# Patient Record
Sex: MALE | Race: BLACK OR AFRICAN AMERICAN | NOT HISPANIC OR LATINO | Employment: FULL TIME | URBAN - METROPOLITAN AREA
[De-identification: names, ages, dates, MRNs, and addresses within clinical notes are randomized per-mention and may not be internally consistent; named-entity substitution may affect disease eponyms.]

---

## 2024-10-21 DIAGNOSIS — R05.9 COUGH: Primary | ICD-10-CM

## 2024-10-22 ENCOUNTER — HOSPITAL ENCOUNTER (OUTPATIENT)
Dept: RADIOLOGY | Facility: HOSPITAL | Age: 59
Discharge: HOME | End: 2024-10-22
Attending: ORTHOPAEDIC SURGERY
Payer: COMMERCIAL

## 2024-10-22 DIAGNOSIS — R05.9 COUGH: ICD-10-CM

## 2024-10-22 PROCEDURE — 71046 X-RAY EXAM CHEST 2 VIEWS: CPT | Mod: TC

## 2024-12-10 ENCOUNTER — HOSPITAL ENCOUNTER (OUTPATIENT)
Facility: HOSPITAL | Age: 59
Discharge: HOME | End: 2024-12-12
Attending: INTERNAL MEDICINE | Admitting: INTERNAL MEDICINE
Payer: COMMERCIAL

## 2024-12-10 DIAGNOSIS — I63.9 STROKE: ICD-10-CM

## 2024-12-10 DIAGNOSIS — G45.9 TIA (TRANSIENT ISCHEMIC ATTACK): Primary | ICD-10-CM

## 2024-12-10 DIAGNOSIS — R42 DIZZINESS: ICD-10-CM

## 2024-12-10 DIAGNOSIS — M79.606 LEG PAIN: ICD-10-CM

## 2024-12-10 LAB
ALBUMIN SERPL-MCNC: 3.9 G/DL (ref 3.5–5)
ALBUMIN/GLOB SERPL: 1.3 RATIO (ref 1.1–2)
ALP SERPL-CCNC: 68 UNIT/L (ref 40–150)
ALT SERPL-CCNC: 17 UNIT/L (ref 0–55)
ANION GAP SERPL CALC-SCNC: 1 MEQ/L
AST SERPL-CCNC: 17 UNIT/L (ref 5–34)
BACTERIA #/AREA URNS AUTO: NORMAL /HPF
BASOPHILS # BLD AUTO: 0.07 X10(3)/MCL
BASOPHILS NFR BLD AUTO: 1 %
BILIRUB SERPL-MCNC: 0.5 MG/DL
BILIRUB UR QL STRIP.AUTO: NEGATIVE
BUN SERPL-MCNC: 13.5 MG/DL (ref 8.4–25.7)
CALCIUM SERPL-MCNC: 8.1 MG/DL (ref 8.4–10.2)
CHLORIDE SERPL-SCNC: 107 MMOL/L (ref 98–107)
CLARITY UR: CLEAR
CO2 SERPL-SCNC: 28 MMOL/L (ref 22–29)
COLOR UR AUTO: NORMAL
CREAT SERPL-MCNC: 0.88 MG/DL (ref 0.72–1.25)
CREAT/UREA NIT SERPL: 15
EOSINOPHIL # BLD AUTO: 0.29 X10(3)/MCL (ref 0–0.9)
EOSINOPHIL NFR BLD AUTO: 4 %
ERYTHROCYTE [DISTWIDTH] IN BLOOD BY AUTOMATED COUNT: 13 % (ref 11.5–17)
FLUAV AG UPPER RESP QL IA.RAPID: NOT DETECTED
FLUBV AG UPPER RESP QL IA.RAPID: NOT DETECTED
GFR SERPLBLD CREATININE-BSD FMLA CKD-EPI: >60 ML/MIN/1.73/M2
GLOBULIN SER-MCNC: 3 GM/DL (ref 2.4–3.5)
GLUCOSE SERPL-MCNC: 85 MG/DL (ref 74–100)
GLUCOSE UR QL STRIP: NORMAL
HCT VFR BLD AUTO: 39.6 % (ref 42–52)
HGB BLD-MCNC: 13 G/DL (ref 14–18)
HGB UR QL STRIP: NEGATIVE
IMM GRANULOCYTES # BLD AUTO: 0.01 X10(3)/MCL (ref 0–0.04)
IMM GRANULOCYTES NFR BLD AUTO: 0.1 %
KETONES UR QL STRIP: NEGATIVE
LEUKOCYTE ESTERASE UR QL STRIP: NEGATIVE
LYMPHOCYTES # BLD AUTO: 3.2 X10(3)/MCL (ref 0.6–4.6)
LYMPHOCYTES NFR BLD AUTO: 44.1 %
MCH RBC QN AUTO: 29.5 PG (ref 27–31)
MCHC RBC AUTO-ENTMCNC: 32.8 G/DL (ref 33–36)
MCV RBC AUTO: 89.8 FL (ref 80–94)
MONOCYTES # BLD AUTO: 0.79 X10(3)/MCL (ref 0.1–1.3)
MONOCYTES NFR BLD AUTO: 10.9 %
NEUTROPHILS # BLD AUTO: 2.89 X10(3)/MCL (ref 2.1–9.2)
NEUTROPHILS NFR BLD AUTO: 39.9 %
NITRITE UR QL STRIP: NEGATIVE
NRBC BLD AUTO-RTO: 0 %
OHS QRS DURATION: 78 MS
OHS QTC CALCULATION: 393 MS
PH UR STRIP: 6.5 [PH]
PLATELET # BLD AUTO: 183 X10(3)/MCL (ref 130–400)
PMV BLD AUTO: 10.7 FL (ref 7.4–10.4)
POTASSIUM SERPL-SCNC: 4.4 MMOL/L (ref 3.5–5.1)
PROT SERPL-MCNC: 6.9 GM/DL (ref 6.4–8.3)
PROT UR QL STRIP: NEGATIVE
RBC # BLD AUTO: 4.41 X10(6)/MCL (ref 4.7–6.1)
RBC #/AREA URNS AUTO: NORMAL /HPF
SARS-COV-2 RNA RESP QL NAA+PROBE: NOT DETECTED
SODIUM SERPL-SCNC: 136 MMOL/L (ref 136–145)
SP GR UR STRIP.AUTO: 1.02 (ref 1–1.03)
SQUAMOUS #/AREA URNS LPF: NORMAL /HPF
TROPONIN I SERPL-MCNC: <0.01 NG/ML (ref 0–0.04)
UROBILINOGEN UR STRIP-ACNC: NORMAL
WBC # BLD AUTO: 7.25 X10(3)/MCL (ref 4.5–11.5)
WBC #/AREA URNS AUTO: NORMAL /HPF

## 2024-12-10 PROCEDURE — 0240U COVID/FLU A&B PCR: CPT | Performed by: PHYSICIAN ASSISTANT

## 2024-12-10 PROCEDURE — 84443 ASSAY THYROID STIM HORMONE: CPT | Performed by: NURSE PRACTITIONER

## 2024-12-10 PROCEDURE — 93010 ELECTROCARDIOGRAM REPORT: CPT | Mod: ,,, | Performed by: INTERNAL MEDICINE

## 2024-12-10 PROCEDURE — 84484 ASSAY OF TROPONIN QUANT: CPT | Performed by: PHYSICIAN ASSISTANT

## 2024-12-10 PROCEDURE — 85025 COMPLETE CBC W/AUTO DIFF WBC: CPT | Performed by: PHYSICIAN ASSISTANT

## 2024-12-10 PROCEDURE — 99285 EMERGENCY DEPT VISIT HI MDM: CPT | Mod: 25

## 2024-12-10 PROCEDURE — 80307 DRUG TEST PRSMV CHEM ANLYZR: CPT | Performed by: NURSE PRACTITIONER

## 2024-12-10 PROCEDURE — 80053 COMPREHEN METABOLIC PANEL: CPT | Performed by: PHYSICIAN ASSISTANT

## 2024-12-10 PROCEDURE — 81001 URINALYSIS AUTO W/SCOPE: CPT | Performed by: PHYSICIAN ASSISTANT

## 2024-12-10 PROCEDURE — 93005 ELECTROCARDIOGRAM TRACING: CPT

## 2024-12-10 NOTE — Clinical Note
Diagnosis: TIA (transient ischemic attack) [526150]   Future Attending Provider: MALIKA CASSIDY [[id number]]   Admit to which facility:: [facility] [[id number]]

## 2024-12-11 LAB
ALBUMIN SERPL-MCNC: 3.6 G/DL (ref 3.5–5)
ALBUMIN/GLOB SERPL: 1.2 RATIO (ref 1.1–2)
ALP SERPL-CCNC: 59 UNIT/L (ref 40–150)
ALT SERPL-CCNC: 17 UNIT/L (ref 0–55)
AMPHET UR QL SCN: NEGATIVE
ANION GAP SERPL CALC-SCNC: 5 MEQ/L
APICAL FOUR CHAMBER EJECTION FRACTION: 64 %
APICAL TWO CHAMBER EJECTION FRACTION: 65 %
AST SERPL-CCNC: 17 UNIT/L (ref 5–34)
AV INDEX (PROSTH): 0.75
AV MEAN GRADIENT: 4 MMHG
AV PEAK GRADIENT: 7.8 MMHG
AV VALVE AREA BY VELOCITY RATIO: 3 CM²
AV VALVE AREA: 3.1 CM²
AV VELOCITY RATIO: 0.71
BARBITURATE SCN PRESENT UR: NEGATIVE
BASOPHILS # BLD AUTO: 0.05 X10(3)/MCL
BASOPHILS NFR BLD AUTO: 0.8 %
BENZODIAZ UR QL SCN: NEGATIVE
BILIRUB SERPL-MCNC: 0.5 MG/DL
BSA FOR ECHO PROCEDURE: 2.08 M2
BUN SERPL-MCNC: 10.5 MG/DL (ref 8.4–25.7)
CALCIUM SERPL-MCNC: 8.6 MG/DL (ref 8.4–10.2)
CANNABINOIDS UR QL SCN: NEGATIVE
CHLORIDE SERPL-SCNC: 107 MMOL/L (ref 98–107)
CHOLEST SERPL-MCNC: 199 MG/DL
CHOLEST/HDLC SERPL: 4 {RATIO} (ref 0–5)
CO2 SERPL-SCNC: 25 MMOL/L (ref 22–29)
COCAINE UR QL SCN: NEGATIVE
CREAT SERPL-MCNC: 0.76 MG/DL (ref 0.72–1.25)
CREAT/UREA NIT SERPL: 14
CV ECHO LV RWT: 0.62 CM
DOP CALC AO PEAK VEL: 1.4 M/S
DOP CALC AO VTI: 29.9 CM
DOP CALC LVOT AREA: 4.2 CM2
DOP CALC LVOT DIAMETER: 2.3 CM
DOP CALC LVOT PEAK VEL: 1 M/S
DOP CALC LVOT STROKE VOLUME: 93 CM3
DOP CALC MV VTI: 27.6 CM
DOP CALCLVOT PEAK VEL VTI: 22.4 CM
E WAVE DECELERATION TIME: 261 MSEC
E/A RATIO: 1.1
E/E' RATIO: 5.11 M/S
ECHO LV POSTERIOR WALL: 1.3 CM (ref 0.6–1.1)
EOSINOPHIL # BLD AUTO: 0.31 X10(3)/MCL (ref 0–0.9)
EOSINOPHIL NFR BLD AUTO: 5.2 %
ERYTHROCYTE [DISTWIDTH] IN BLOOD BY AUTOMATED COUNT: 13 % (ref 11.5–17)
EST. AVERAGE GLUCOSE BLD GHB EST-MCNC: 116.9 MG/DL
FENTANYL UR QL SCN: NEGATIVE
FRACTIONAL SHORTENING: 35.7 % (ref 28–44)
GFR SERPLBLD CREATININE-BSD FMLA CKD-EPI: >60 ML/MIN/1.73/M2
GLOBULIN SER-MCNC: 2.9 GM/DL (ref 2.4–3.5)
GLUCOSE SERPL-MCNC: 78 MG/DL (ref 74–100)
HBA1C MFR BLD: 5.7 %
HCT VFR BLD AUTO: 39 % (ref 42–52)
HDLC SERPL-MCNC: 47 MG/DL (ref 35–60)
HGB BLD-MCNC: 13.3 G/DL (ref 14–18)
HR MV ECHO: 59 BPM
IMM GRANULOCYTES # BLD AUTO: 0.02 X10(3)/MCL (ref 0–0.04)
IMM GRANULOCYTES NFR BLD AUTO: 0.3 %
INTERVENTRICULAR SEPTUM: 1.2 CM (ref 0.6–1.1)
LDLC SERPL CALC-MCNC: 138 MG/DL (ref 50–140)
LEFT ATRIUM AREA SYSTOLIC (APICAL 2 CHAMBER): 19.5 CM2
LEFT ATRIUM AREA SYSTOLIC (APICAL 4 CHAMBER): 16.6 CM2
LEFT ATRIUM SIZE: 3.9 CM
LEFT ATRIUM VOLUME INDEX MOD: 24 ML/M2
LEFT ATRIUM VOLUME MOD: 51.3 ML
LEFT CCA DIST DIAS: 18 CM/S
LEFT CCA DIST SYS: 71 CM/S
LEFT CCA PROX DIAS: 16 CM/S
LEFT CCA PROX SYS: 101 CM/S
LEFT ECA DIAS: 3 CM/S
LEFT ECA SYS: 58 CM/S
LEFT ICA DIST DIAS: 17 CM/S
LEFT ICA DIST SYS: 60 CM/S
LEFT ICA MID DIAS: 21 CM/S
LEFT ICA MID SYS: 64 CM/S
LEFT ICA PROX DIAS: 14 CM/S
LEFT ICA PROX SYS: 57 CM/S
LEFT INTERNAL DIMENSION IN SYSTOLE: 2.7 CM (ref 2.1–4)
LEFT VENTRICLE DIASTOLIC VOLUME INDEX: 36.73 ML/M2
LEFT VENTRICLE DIASTOLIC VOLUME: 78.6 ML
LEFT VENTRICLE END DIASTOLIC VOLUME APICAL 2 CHAMBER: 51.5 ML
LEFT VENTRICLE END DIASTOLIC VOLUME APICAL 4 CHAMBER: 72.9 ML
LEFT VENTRICLE END SYSTOLIC VOLUME APICAL 2 CHAMBER: 53.4 ML
LEFT VENTRICLE END SYSTOLIC VOLUME APICAL 4 CHAMBER: 41.9 ML
LEFT VENTRICLE MASS INDEX: 88.4 G/M2
LEFT VENTRICLE SYSTOLIC VOLUME INDEX: 12.6 ML/M2
LEFT VENTRICLE SYSTOLIC VOLUME: 27 ML
LEFT VENTRICULAR INTERNAL DIMENSION IN DIASTOLE: 4.2 CM (ref 3.5–6)
LEFT VENTRICULAR MASS: 189.2 G
LEFT VERTEBRAL DIAS: 8 CM/S
LEFT VERTEBRAL SYS: 40 CM/S
LV LATERAL E/E' RATIO: 3.83 M/S
LV SEPTAL E/E' RATIO: 7.67 M/S
LVED V (TEICH): 78.6 ML
LVES V (TEICH): 27 ML
LVOT MG: 2 MMHG
LVOT MV: 0.66 CM/S
LYMPHOCYTES # BLD AUTO: 2.54 X10(3)/MCL (ref 0.6–4.6)
LYMPHOCYTES NFR BLD AUTO: 42.8 %
MAGNESIUM SERPL-MCNC: 2 MG/DL (ref 1.6–2.6)
MCH RBC QN AUTO: 29.9 PG (ref 27–31)
MCHC RBC AUTO-ENTMCNC: 34.1 G/DL (ref 33–36)
MCV RBC AUTO: 87.6 FL (ref 80–94)
MDMA UR QL SCN: NEGATIVE
MONOCYTES # BLD AUTO: 0.66 X10(3)/MCL (ref 0.1–1.3)
MONOCYTES NFR BLD AUTO: 11.1 %
MV MEAN GRADIENT: 1 MMHG
MV PEAK A VEL: 0.63 M/S
MV PEAK E VEL: 0.69 M/S
MV PEAK GRADIENT: 2 MMHG
MV STENOSIS PRESSURE HALF TIME: 69 MS
MV VALVE AREA BY CONTINUITY EQUATION: 3.37 CM2
MV VALVE AREA P 1/2 METHOD: 3.19 CM2
NEUTROPHILS # BLD AUTO: 2.36 X10(3)/MCL (ref 2.1–9.2)
NEUTROPHILS NFR BLD AUTO: 39.8 %
NRBC BLD AUTO-RTO: 0 %
OHS CV CAROTID RIGHT ICA EDV HIGHEST: 25
OHS CV CAROTID ULTRASOUND LEFT ICA/CCA RATIO: 0.9
OHS CV CAROTID ULTRASOUND RIGHT ICA/CCA RATIO: 1.19
OHS CV PV CAROTID LEFT HIGHEST CCA: 101
OHS CV PV CAROTID LEFT HIGHEST ICA: 64
OHS CV PV CAROTID RIGHT HIGHEST CCA: 94
OHS CV PV CAROTID RIGHT HIGHEST ICA: 80
OHS CV US CAROTID LEFT HIGHEST EDV: 21
OHS LV EJECTION FRACTION SIMPSONS BIPLANE MOD: 65 %
OPIATES UR QL SCN: NEGATIVE
PCP UR QL: NEGATIVE
PH UR: 6.5 [PH] (ref 3–11)
PISA TR MAX VEL: 1.24 M/S
PLATELET # BLD AUTO: 171 X10(3)/MCL (ref 130–400)
PMV BLD AUTO: 10.7 FL (ref 7.4–10.4)
POTASSIUM SERPL-SCNC: 3.6 MMOL/L (ref 3.5–5.1)
PROT SERPL-MCNC: 6.5 GM/DL (ref 6.4–8.3)
PV PEAK GRADIENT: 3 MMHG
PV PEAK VELOCITY: 0.85 M/S
RA PRESSURE ESTIMATED: 3 MMHG
RBC # BLD AUTO: 4.45 X10(6)/MCL (ref 4.7–6.1)
RIGHT CCA DIST DIAS: 15 CM/S
RIGHT CCA DIST SYS: 67 CM/S
RIGHT CCA PROX DIAS: 11 CM/S
RIGHT CCA PROX SYS: 94 CM/S
RIGHT ECA DIAS: 6 CM/S
RIGHT ECA SYS: 51 CM/S
RIGHT ICA DIST DIAS: 25 CM/S
RIGHT ICA DIST SYS: 74 CM/S
RIGHT ICA MID DIAS: 15 CM/S
RIGHT ICA MID SYS: 80 CM/S
RIGHT ICA PROX DIAS: 8 CM/S
RIGHT ICA PROX SYS: 50 CM/S
RIGHT VERTEBRAL DIAS: 9 CM/S
RIGHT VERTEBRAL SYS: 47 CM/S
RV TB RVSP: 4 MMHG
SODIUM SERPL-SCNC: 137 MMOL/L (ref 136–145)
SPECIFIC GRAVITY, URINE AUTO (.000) (OHS): 1.02 (ref 1–1.03)
TDI LATERAL: 0.18 M/S
TDI SEPTAL: 0.09 M/S
TDI: 0.14 M/S
TR MAX PG: 6 MMHG
TRICUSPID ANNULAR PLANE SYSTOLIC EXCURSION: 2.28 CM
TRIGL SERPL-MCNC: 70 MG/DL (ref 34–140)
TSH SERPL-ACNC: 1.9 UIU/ML (ref 0.35–4.94)
TV REST PULMONARY ARTERY PRESSURE: 9 MMHG
VLDLC SERPL CALC-MCNC: 14 MG/DL
WBC # BLD AUTO: 5.94 X10(3)/MCL (ref 4.5–11.5)
Z-SCORE OF LEFT VENTRICULAR DIMENSION IN END DIASTOLE: -4.94
Z-SCORE OF LEFT VENTRICULAR DIMENSION IN END SYSTOLE: -3.48

## 2024-12-11 PROCEDURE — 80061 LIPID PANEL: CPT | Performed by: NURSE PRACTITIONER

## 2024-12-11 PROCEDURE — 36415 COLL VENOUS BLD VENIPUNCTURE: CPT | Performed by: NURSE PRACTITIONER

## 2024-12-11 PROCEDURE — 97161 PT EVAL LOW COMPLEX 20 MIN: CPT

## 2024-12-11 PROCEDURE — G0378 HOSPITAL OBSERVATION PER HR: HCPCS

## 2024-12-11 PROCEDURE — 63600175 PHARM REV CODE 636 W HCPCS: Performed by: NURSE PRACTITIONER

## 2024-12-11 PROCEDURE — 96372 THER/PROPH/DIAG INJ SC/IM: CPT | Performed by: NURSE PRACTITIONER

## 2024-12-11 PROCEDURE — 83735 ASSAY OF MAGNESIUM: CPT | Performed by: NURSE PRACTITIONER

## 2024-12-11 PROCEDURE — 80053 COMPREHEN METABOLIC PANEL: CPT | Performed by: NURSE PRACTITIONER

## 2024-12-11 PROCEDURE — 83036 HEMOGLOBIN GLYCOSYLATED A1C: CPT | Performed by: NURSE PRACTITIONER

## 2024-12-11 PROCEDURE — 97165 OT EVAL LOW COMPLEX 30 MIN: CPT

## 2024-12-11 PROCEDURE — 25000003 PHARM REV CODE 250: Performed by: NURSE PRACTITIONER

## 2024-12-11 PROCEDURE — 25500020 PHARM REV CODE 255: Performed by: INTERNAL MEDICINE

## 2024-12-11 PROCEDURE — 85025 COMPLETE CBC W/AUTO DIFF WBC: CPT | Performed by: NURSE PRACTITIONER

## 2024-12-11 PROCEDURE — 92523 SPEECH SOUND LANG COMPREHEN: CPT

## 2024-12-11 RX ORDER — ASPIRIN 81 MG/1
81 TABLET ORAL DAILY
Status: DISCONTINUED | OUTPATIENT
Start: 2024-12-11 | End: 2024-12-12 | Stop reason: HOSPADM

## 2024-12-11 RX ORDER — PROCHLORPERAZINE EDISYLATE 5 MG/ML
5 INJECTION INTRAMUSCULAR; INTRAVENOUS EVERY 6 HOURS PRN
Status: DISCONTINUED | OUTPATIENT
Start: 2024-12-11 | End: 2024-12-12 | Stop reason: HOSPADM

## 2024-12-11 RX ORDER — ENOXAPARIN SODIUM 100 MG/ML
40 INJECTION SUBCUTANEOUS EVERY 24 HOURS
Status: DISCONTINUED | OUTPATIENT
Start: 2024-12-11 | End: 2024-12-12 | Stop reason: HOSPADM

## 2024-12-11 RX ORDER — SODIUM CHLORIDE 0.9 % (FLUSH) 0.9 %
10 SYRINGE (ML) INJECTION
Status: DISCONTINUED | OUTPATIENT
Start: 2024-12-11 | End: 2024-12-12 | Stop reason: HOSPADM

## 2024-12-11 RX ORDER — ASPIRIN 325 MG
325 TABLET ORAL ONCE
Status: COMPLETED | OUTPATIENT
Start: 2024-12-11 | End: 2024-12-11

## 2024-12-11 RX ORDER — LABETALOL HYDROCHLORIDE 5 MG/ML
10 INJECTION, SOLUTION INTRAVENOUS
Status: DISCONTINUED | OUTPATIENT
Start: 2024-12-11 | End: 2024-12-12 | Stop reason: HOSPADM

## 2024-12-11 RX ORDER — ONDANSETRON HYDROCHLORIDE 2 MG/ML
4 INJECTION, SOLUTION INTRAVENOUS EVERY 4 HOURS PRN
Status: DISCONTINUED | OUTPATIENT
Start: 2024-12-11 | End: 2024-12-12 | Stop reason: HOSPADM

## 2024-12-11 RX ORDER — ACETAMINOPHEN 325 MG/1
650 TABLET ORAL EVERY 6 HOURS PRN
Status: DISCONTINUED | OUTPATIENT
Start: 2024-12-11 | End: 2024-12-12 | Stop reason: HOSPADM

## 2024-12-11 RX ORDER — BISACODYL 10 MG/1
10 SUPPOSITORY RECTAL DAILY PRN
Status: DISCONTINUED | OUTPATIENT
Start: 2024-12-11 | End: 2024-12-12 | Stop reason: HOSPADM

## 2024-12-11 RX ORDER — HYDRALAZINE HYDROCHLORIDE 20 MG/ML
10 INJECTION INTRAMUSCULAR; INTRAVENOUS EVERY 6 HOURS PRN
Status: DISCONTINUED | OUTPATIENT
Start: 2024-12-11 | End: 2024-12-12 | Stop reason: HOSPADM

## 2024-12-11 RX ADMIN — ASPIRIN 81 MG: 81 TABLET, COATED ORAL at 08:12

## 2024-12-11 RX ADMIN — ENOXAPARIN SODIUM 40 MG: 40 INJECTION SUBCUTANEOUS at 06:12

## 2024-12-11 RX ADMIN — IOHEXOL 65 ML: 350 INJECTION, SOLUTION INTRAVENOUS at 12:12

## 2024-12-11 RX ADMIN — ASPIRIN 325 MG ORAL TABLET 325 MG: 325 PILL ORAL at 02:12

## 2024-12-11 NOTE — H&P
[facility] Hospital Medicine History & Physical Examination       Patient Name: Rangel Urias  MRN: [id number]  Patient Class: OP- Observation     Admission Date: 12/10/2024   Admitting Physician: MONIQUE Service     Length of Stay: 0  Attending Physician: Jud Morse DO    Primary Care Provider: Baldomero Wise MD  Face-to-Face encounter date: 12/11/2024      Code StatusFull Code   Chief Complaint: Dizziness (Pt to ED via POV. Pt endorses dizziness and severe headache X 1 week. Denies cardiac hx. Steady gait in triage. GCS 15)      Screening for Social Drivers for health:  Patient screened for food insecurity, housing instability, transportation needs, utility difficulties, and interpersonal safety (select all that apply as identified as concern)  []Housing or Food  []Transportation Needs  []Utility Difficulties  []Interpersonal safety  [x]None      Patient information was obtained from patient, patient's family, past medical records and ER records.  ED records were reviewed in detail and documented below    HISTORY OF PRESENT ILLNESS:     Rangel Urias is a 59 y.o. male who  has a past medical history of BPH (benign prostatic hyperplasia), Cataract, and Glaucoma..     The patient presented to [facility] on 12/10/2024 with a primary complaint of dizziness.   presents to ED for evaluation of intermittent headaches and dizziness over the last week.  Patient states he has episodes where he feels dizzy like he wants to pass out and off balance.  Patient reports generalized body aches    Dizziness started about 1 month ago and has progressively worse, intermittently comes and goes nothing makes it better nothing makes it worse.    Patient also reports about 1 month ago he had right-sided weakness went to sleep and woke up weakness was resolved.    He has no known history of hypertension.  No recent changes to medications.    He is relatively active, works a full-time job and does ADLs and I  ADLs with no assistance.    He does not use tobacco products and does not drink alcohol.    Patient does not partake in illegal drug use.      PAST MEDICAL HISTORY:     Past Medical History:   Diagnosis Date    BPH (benign prostatic hyperplasia)     Cataract     Glaucoma        PAST SURGICAL HISTORY:     Past Surgical History:   Procedure Laterality Date    COLONOSCOPY      PHACOEMULSIFICATION WITH INSERTION, I-STENT Right 4/11/2023    Procedure: PHACO WITH IOL, HYDRUS- OD **SYNERGY**;  Surgeon: Jami Wheeler MD;  Location: [facility];  Service: Ophthalmology;  Laterality: Right;    RHINOPLASTY      2015       ALLERGIES:   Zoloft [sertraline]    FAMILY HISTORY:   Reviewed and negative    SOCIAL HISTORY:     Social History     Tobacco Use    Smoking status: Never     Passive exposure: Never    Smokeless tobacco: Never   Substance Use Topics    Alcohol use: Not Currently        HOME MEDICATIONS:     Prior to Admission medications    Medication Sig Start Date End Date Taking? Authorizing Provider   FINASTERIDE ORAL Take by mouth.   Yes Provider, Historical       REVIEW OF SYSTEMS:   Except as documented, all other systems reviewed and negative     PHYSICAL EXAM:     VITAL SIGNS: 24 HRS MIN & MAX LAST   Temp  Min: 97.3 °F (36.3 °C)  Max: 98.4 °F (36.9 °C) 97.7 °F (36.5 °C)   BP  Min: 118/74  Max: 175/92 133/82   Pulse  Min: 47  Max: 58  (!) 50   Resp  Min: 16  Max: 20 18   SpO2  Min: 98 %  Max: 100 % 100 %       I have personally reviewed vital signs     General appearance: Well-developed, well-nourished male in no apparent distress.  HENT: Atraumatic head. Moist mucous membranes of oral cavity.  Eyes: Normal extraocular movements.       Neck: Supple.   Lungs: Clear to auscultation bilaterally. No wheezing present.   Heart: Regular rate and rhythm. S1 and S2 present with no murmurs/gallop/rub. No pedal edema.     Abdomen: Soft, non-distended, non-tender. No rebound tenderness/guarding. Bowel sounds are normal.      Extremities: No cyanosis, clubbing, or edema.  Skin: No Rash.       Neuro: Motor and sensory exams grossly intact. Good tone  Purposeful movement noted; awake, alert, oriented. Comprehension intact, no aphasia     Psych/mental status: Appropriate mood and affect. Responds appropriately to questions.         LABS AND IMAGING:     Recent Labs   Lab 12/10/24  1922 12/11/24  0404   WBC 7.25 5.94   RBC 4.41* 4.45*   HGB 13.0* 13.3*   HCT 39.6* 39.0*   MCV 89.8 87.6   MCH 29.5 29.9   MCHC 32.8* 34.1   RDW 13.0 13.0    171   MPV 10.7* 10.7*       Recent Labs   Lab 12/10/24  1922 12/11/24  0404    137   K 4.4 3.6    107   CO2 28 25   BUN 13.5 10.5   CREATININE 0.88 0.76   CALCIUM 8.1* 8.6   MG  --  2.00   ALBUMIN 3.9 3.6   ALKPHOS 68 59   ALT 17 17   AST 17 17   BILITOT 0.5 0.5       Microbiology Results (last 7 days)       ** No results found for the last 168 hours. **             CV Ultrasound Bilateral Doppler Carotid  The right internal carotid artery is patent with less than 50% stenosis.  The left internal carotid artery is patent with less than 50% stenosis.  Bilateral vertebral arteries are patent with antegrade flow.  Echo    Left Ventricle: The left ventricle is normal in size. Mildly increased   wall thickness. There is normal systolic function with a visually   estimated ejection fraction of 60 - 65%. There is normal diastolic   function.    Right Ventricle: Normal right ventricular cavity size. Systolic   function is normal. TAPSE is 2.28 cm.    Left Atrium: Agitated saline study of the atrial septum is negative,   suggesting absence of intracardiac shunt at the atrial level.    Mitral Valve: There is mild mitral annular calcification present. The   mean pressure gradient across the mitral valve is 1 mmHg at a heart rate   of 59 bpm. There is mild regurgitation.    Tricuspid Valve: There is trace regurgitation.    IVC/SVC: Normal venous pressure at 3 mmHg.    Pericardium: There is no  pericardial effusion.  CTA Head and Neck (xpd)  Narrative: EXAMINATION:  CTA HEAD AND NECK (XPD)    CLINICAL HISTORY:  Stroke/TIA, determine embolic source;    TECHNIQUE:  Axial images of the head and neck were obtained with IV contrast administration.  Coronal and sagittal reconstructions were provided.  Three dimensional and MIP images were obtained and evaluated.  Total DLP was 1194 mGy-cm. Dose lowering technique and automated exposure control were utilized for this exam.The degree of carotid stenosis was evaluated using NASCET criteria.    COMPARISON:  CT of the head 12/10/2024.    FINDINGS:  Vascular findings:    There is a bovine configuration of the aortic arch.  The brachiocephalic artery is normal.  The right common carotid artery is normal.  The right cervical ICA is normal.  The left common carotid artery is normal.  The left cervical ICA is normal.  There are codominant vertebral arteries which are normal throughout their cervical courses.  The intradural vertebral arteries are normal.  The basilar artery is normal.  The petrous and cavernous carotids are normal.  The right A1 segment is absent with no anterior communicating artery aneurysm.  The xipzwg-ub-Dxmdde is otherwise intact.  There is no aneurysm.  There is no focal stenosis.  There is no paucity of vasculature.  The dural venous sinuses are widely patent.    Nonvascular findings:    There is no hemorrhage, hydrocephalus, or midline shift.  There is no abnormal intracranial enhancement.  The bilateral orbits are normal.  The paranasal sinuses are free of disease.  The airway is patent.  The parotid and submandibular glands are normal.  There is no cervical lymphadenopathy.  The thyroid gland is normal.  The lung apices are clear.  Impression: Normal arteries of the head and neck.    [employer] concordance    Electronically signed by: Jerel Steward MD  Date:    12/11/2024  Time:    06:38  MRI Brain Without Contrast  Narrative: EXAMINATION:  MRI  BRAIN WITHOUT CONTRAST    CLINICAL HISTORY:  Transient ischemic attack (TIA);Stroke, follow up;    TECHNIQUE:  Multiplanar MRI sequences were performed of the brain without contrast.    COMPARISON:  CT brain without contrast December 10, 2024    FINDINGS:  Chronic microvascular ischemic change are minimal with subtle periventricular T2 FLAIR hyperintense signals.  There is  cerebral and cerebellar cortical volume loss. Gradient echo sequences demonstrate no evidence of de phasing artifact to suggest hemorrhagic byproducts. No evidence of diffusion restriction or ADC map signal drop out to suggest acute infarct. The sella and suprasellar areas are unremarkable.    The cerebellar tonsils are normally positioned. There is no acute intracranial hemorrhage, hydrocephalus, midline shift or mass effect. No acute extra axial fluid collections identified. The mastoid air cells are clear.  Impression: 1.  No acute intracranial findings identified..    2.  Minimal chronic microangiopathic ischemia and atrophy.    Electronically signed by: Cuong Torres  Date:    12/11/2024  Time:    06:20      ASSESSMENT & PLAN:     ---dizziness,  Etiology not well known, currently undergoing stroke workup.  May be associated with hypertension (significantly elevated on admission)  Etiology is being investigated.    Risk factors reviewed including hypertension, lipid panel, hemoglobin A1c, tobacco use, familyhistory  Evaluate for embolic cause, follow up on results of echocardiogram, continue cardiac monitoring to evaluate for atrial fibrillation/other cardiac arrhythmias  Evaluate for infectious etiology/metabolic changes that may mimic stroke    Continue to monitor vitals sign and neuro checks q.4 hours, unless otherwise specified.   MRI pending   Continue telemetry  Maintain IV access, provide gentle IV hydration   Provide supplemental oxygen if needed for oxygen saturation less than 90-92%   Monitor blood glucose and avoid  hypo/hyperglycemia, sliding scale insulin as needed    Monitor for seizures-seizure precautions and fall precautions.    Appropriate prevention to be continued/medical optimization-aspirin statin therapy  Early rehabilitation with speech, PT and OT as deemed appropriate/necessary    VTE Prophylaxis: will be placed on Lovenox for DVT prophylaxis and will be advised to be as mobile as possible and sit in a chair as tolerated    Patient condition:  Stable/Fair/Guarded/ Serious/ Critical    __________________________________________________________________________  INPATIENT LIST OF MEDICATIONS     Scheduled Meds:   aspirin  81 mg Oral Daily    enoxparin  40 mg Subcutaneous Daily     Continuous Infusions:  PRN Meds:.  Current Facility-Administered Medications:     acetaminophen, 650 mg, Oral, Q6H PRN    bisacodyL, 10 mg, Rectal, Daily PRN    hydrALAZINE, 10 mg, Intravenous, Q6H PRN    labetalol, 10 mg, Intravenous, Q2H PRN    ondansetron, 4 mg, Intravenous, Q4H PRN    prochlorperazine, 5 mg, Intravenous, Q6H PRN    sodium chloride 0.9%, 10 mL, Intravenous, PRN          If patient was admitted under observational status it is with my approval/permission.        All diagnosis and differential diagnosis have been reviewed; assessment and plan has been documented; I have personally reviewed the labs and test results that are presently available; I have reviewed the patients medication list; I have reviewed the consulting providers response and recommendations. I have reviewed or attempted to review medical records based upon their availability.    All of the patient and family questions have been addressed and answered. Patient's is agreeable to the above stated plan. I will continue to monitor closely and make adjustments to medical management as needed.    If patient was admitted under observational status it is with my approval/permission.      Jud Morse,    12/11/2024

## 2024-12-11 NOTE — PT/OT/SLP EVAL
Physical Therapy Evaluation and Discharge Note    Patient Name:  Rangel Urias   MRN:  [id number]    Recommendations:     Discharge therapy intensity: No Therapy Indicated   Discharge Equipment Recommendations: none   Barriers to discharge: Ongoing medical needs    Assessment:     Rangel Urias is a 59 y.o. male admitted with a medical diagnosis of dizziness, TIA. Upon eval, pt performed all mobility independently. BLE strength good. Denies N/T. Pt with c/o of generalized weakness due to laying in the bed. Pt amb 250ft Independently; steady step through pattern. At this time, patient is functioning at their prior level of function and does not require further acute PT services.     Recent Surgery: * No surgery found *      Plan:     During this hospitalization, patient does not require further acute PT services.  Please re-consult if situation changes.      Subjective     Chief Complaint: general weakness, and tenderness behind R calf   Patient/Family Comments/goals: return home  Pain/Comfort:  Pain Rating 1: 0/10    Patients cultural, spiritual, Christian conflicts given the current situation: no    Living Environment:  Pt [place] with [place] in [facility] with no [place].   Prior to admission, patients level of function was Independently; working, .  Equipment used at home: none.  DME owned (not currently used): none.  Upon discharge, patient will have assistance from spouse.    Objective:     Communicated with RN prior to session.  Patient found HOB elevated with telemetry, peripheral IV upon PT entry to room.    General Precautions: Standard, fall    Orthopedic Precautions:N/A   Braces: N/A  Respiratory Status: Room air  Blood Pressure: 121/78    Exams:  RLE Strength: 5/5 grossly  LLE Strength: 5/5 grossly    Functional Mobility:  Bed Mobility:     Supine to Sit: independence  Transfers:     Sit to Stand:  independence with no AD  Gait: Pt amb 250ft Independently with no AD; steady step through pattern, normal arm swing,  and good chaim. No safety concerns.     AM-PAC 6 CLICK MOBILITY  Total Score:24       Treatment and Education:      Patient provided with verbal education education regarding PT role/goals/POC, fall prevention, safety awareness, and discharge/DME recommendations.  Understanding was verbalized.     Patient left up in chair with all lines intact, call button in reach, and spouse present.    GOALS:   Multidisciplinary Problems       Physical Therapy Goals       Not on file                    History:     Past Medical History:   Diagnosis Date    BPH (benign prostatic hyperplasia)     Cataract     Glaucoma        Past Surgical History:   Procedure Laterality Date    COLONOSCOPY      PHACOEMULSIFICATION WITH INSERTION, I-STENT Right 4/11/2023    Procedure: PHACO WITH IOL, HYDRUS- OD **SYNERGY**;  Surgeon: Jami Wheeler MD;  Location: [facility];  Service: Ophthalmology;  Laterality: Right;    RHINOPLASTY      2015       Time Tracking:     PT Received On: 12/11/24  PT Start Time: 1013     PT Stop Time: 1022  PT Total Time (min): 9 min     Billable Minutes: Evaluation Low      12/11/2024

## 2024-12-11 NOTE — PT/OT/SLP EVAL
[facility]  Speech Language Pathology Department  Cognitive-Communication Evaluation    Patient Name:  Rangel Urias   MRN:  [id number]    Recommendations     General recommendations:  SLP intervention not indicated  Communication strategies:  none    Discharge therapy intensity: No Therapy Indicated  Barriers to safe discharge: none    History     Rangel Urias is a 59 y.o. male with a medical diagnosis of dizziness, TIA.      Past Medical History:   Diagnosis Date    BPH (benign prostatic hyperplasia)     Cataract     Glaucoma      Past Surgical History:   Procedure Laterality Date    COLONOSCOPY      PHACOEMULSIFICATION WITH INSERTION, I-STENT Right 4/11/2023    Procedure: PHACO WITH IOL, HYDRUS- OD **SYNERGY**;  Surgeon: Jami Wheeler MD;  Location: [facility];  Service: Ophthalmology;  Laterality: Right;    RHINOPLASTY      2015       Previous level of Function  Home Responsibilities:  independent    Imaging   Results for orders placed during the hospital encounter of 12/10/24    MRI Brain Without Contrast    Narrative  EXAMINATION:  MRI BRAIN WITHOUT CONTRAST    CLINICAL HISTORY:  Transient ischemic attack (TIA);Stroke, follow up;    TECHNIQUE:  Multiplanar MRI sequences were performed of the brain without contrast.    COMPARISON:  CT brain without contrast December 10, 2024    FINDINGS:  Chronic microvascular ischemic change are minimal with subtle periventricular T2 FLAIR hyperintense signals.  There is  cerebral and cerebellar cortical volume loss. Gradient echo sequences demonstrate no evidence of de phasing artifact to suggest hemorrhagic byproducts. No evidence of diffusion restriction or ADC map signal drop out to suggest acute infarct. The sella and suprasellar areas are unremarkable.    The cerebellar tonsils are normally positioned. There is no acute intracranial hemorrhage, hydrocephalus, midline shift or mass effect. No acute extra axial fluid collections identified.  The mastoid air cells are clear.    Impression  1.  No acute intracranial findings identified..    2.  Minimal chronic microangiopathic ischemia and atrophy.      Electronically signed by: Cuong Torres  Date:    12/11/2024  Time:    06:20    Subjective     Patient awake, alert, and cooperative.    Spiritual/Cultural/Faith Beliefs/Practices that affect care: no    Pain/Comfort: Pain Rating 1: 0/10    Objective     ORAL MUSCULATURE  Dentition: own teeth  Facial Movement: WFL  Buccal Strength & Mobility: WFL  Mandibular Strength & Mobility: WFL  Oral Labial Strength & Mobility: WFL  Lingual Strength & Mobility: WFL    SPEECH PRODUCTION  Phoneme Production: adequate  Voice Quality: adequate  Voice Production: adequate  Speech Rate: appropriate  Loudness: acceptable  Respiration: WFL for speech  Resonance: adequate  Prosody: adequate  Speech Intelligibility  Known Context: Greater that 90%  Unknown Context: Greater that 90%    AUDITORY COMPREHENSION  Following Directions:  1-Step: Within Functional Limits  2-Step: Within Functional Limits  Yes/No Questions:  Simple: Within Functional Limits  Complex: Within Functional Limits    VERBAL EXPRESSION  Automatic Speech:  Functional needs: Within Functional Limits  Confrontation Naming  Objects: Within Functional Limits  Wh- Questions:  Object name: Within Functional Limits  Object function: Within Functional Limits    COGNITION  Orientation:  Person: yes  Place: yes  Time: yes  Situation: yes   Attention:  Sustained: Within Functional Limits  Memory:  Short Term: Within Functional Limits  Long Term: Within Functional Limits  Problem Solving  Functional simple: Within Functional Limits  Organization:  Divergent thinking: Within Functional Limits    Assessment     Cognitive-linguistic skills found WFL. Patient reports to be at baseline at this time. ST to sign off.    Goals     Multidisciplinary Problems       SLP Goals       Not on file                  Patient Education      Patient provided with verbal education regarding ST POC.  Understanding was verbalized.    Plan     SLP Follow-Up:  No   Plan of Care reviewed with:  patient      Time Tracking     SLP Treatment Date:   12/11/24  Speech Start Time:  1415  Speech Stop Time:  1430     Speech Total Time (min):  15 min    Billable minutes:  Evaluation of Speech Sound Production with Comprehension and Expression, 15 minutes     12/11/2024

## 2024-12-11 NOTE — PT/OT/SLP EVAL
Occupational Therapy   Evaluation and Discharge Note    Name: Rangel Urias  MRN: [id number]  Recent Surgery: * No surgery found *      Recommendations:     Discharge therapy intensity: No Therapy Indicated   Discharge Equipment Recommendations: none  Barriers to discharge:  None    Assessment:     Rangel Urias is a 59 y.o. male with a medical diagnosis of dizziness, TIA. On eval, patient presents with independence with self-care skills. No equipment needed or safety concerns. Pt has good support at home. Re-consult as needed. Skilled OT services are not warranted at this time.    Plan:     OT to sign off as acute OT services are not warranted at this time.  Please re-consult if situation changes during this hospitalization.    Plan of Care Reviewed with: patient    Subjective     Chief Complaint: none  Patient/Family Comments/goals: return home    Occupational Profile:  Living Environment: pt lives in [facility] with wife; 0STE; walk-in shower   Previous level of function: independent  Roles and Routines: , employee  Equipment Used at Home: none  Assistance upon Discharge: wife    Pain/Comfort:  Pain Rating 1: 0/10    Patients cultural, spiritual, Adventism conflicts given the current situation: no    Objective:     OT communicated with nursing prior to session.      Patient was found HOB elevated with telemetry upon OT entry to room.    General Precautions: Standard, fall  Orthopedic Precautions: N/A  Braces: N/A    Vital Signs: Blood Pressure: 121/78; HR 62    Bed Mobility:    Patient completed Supine to Sit with independence    Functional Mobility/Transfers:  Patient completed Sit <> Stand Transfer with independence  with  no assistive device   Patient completed Toilet Transfer Step Transfer technique with independence with  no AD  Functional Mobility: Performed functional mobility with no AD in hallway. No LOB.     Activities of Daily Living:  Lower Body Dressing: independence to don/doff socks  Toileting:  independence      [facility] 6 Click ADL:  AMPA Total Score: 24    Functional Cognition:  Intact    Visual Perceptual Skills:  Intact  Able to track in all planes  Finger-nose intact  Pt reports glaucoma/blurriness at baseline but no new deficits.    Upper Extremity Function:  Right Upper Extremity:   Strength 5/5 overall  No sensation differences.    Left Upper Extremity:  Strength 5/5 overall  No sensation differences.    Balance:   Intact    Therapeutic Positioning  Risk for acquired pressure injuries is decreased due to intact sensation, continence, and ability to mobilize independently .    OT interventions performed during the course of today's session in an effort to prevent and/or reduce acquired pressure injuries:   Education was provided on benefits of and recommendations for therapeutic positioning  Therapeutic positioning was provided at the conclusion of session to offload all bony prominences for the prevention and/or reduction of pressure injuries    Skin assessment:  all visible skin intact    OT recommendations for therapeutic positioning throughout hospitalization:   Follow [facility] Pressure Injury Prevention Protocol    Patient Education:  Patient provided with verbal education education regarding OT role/goals/POC, fall prevention, safety awareness, and Discharge/DME recommendations.  Understanding was verbalized.     Patient left up in chair with call button in reach and wife present.    History:     Past Medical History:   Diagnosis Date    BPH (benign prostatic hyperplasia)     Cataract     Glaucoma          Past Surgical History:   Procedure Laterality Date    COLONOSCOPY      PHACOEMULSIFICATION WITH INSERTION, I-STENT Right 4/11/2023    Procedure: PHACO WITH IOL, HYDRUS- OD **SYNERGY**;  Surgeon: Jami Wheeler MD;  Location: [facility];  Service: Ophthalmology;  Laterality: Right;    RHINOPLASTY      2015       Time Tracking:     OT Date of Treatment:    OT Start Time: 1013  OT Stop Time:  1024  OT Total Time (min): 11 min    Billable Minutes:Evaluation low    12/11/2024

## 2024-12-11 NOTE — PLAN OF CARE
Problem: Infection  Goal: Absence of Infection Signs and Symptoms  Outcome: Progressing     Problem: Stroke, Ischemic (Includes Transient Ischemic Attack)  Goal: Optimal Coping  Outcome: Progressing  Goal: Effective Bowel Elimination  Outcome: Progressing  Goal: Optimal Cerebral Tissue Perfusion  Outcome: Progressing  Goal: Optimal Cognitive Function  Outcome: Progressing  Goal: Improved Communication Skills  Outcome: Progressing  Goal: Optimal Functional Ability  Outcome: Progressing  Goal: Optimal Nutrition Intake  Outcome: Progressing  Goal: Effective Oxygenation and Ventilation  Outcome: Progressing  Goal: Improved Sensorimotor Function  Outcome: Progressing  Goal: Safe and Effective Swallow  Outcome: Progressing  Goal: Effective Urinary Elimination  Outcome: Progressing     Problem: Adult Inpatient Plan of Care  Goal: Plan of Care Review  Outcome: Progressing  Goal: Patient-Specific Goal (Individualized)  Outcome: Progressing  Goal: Absence of Hospital-Acquired Illness or Injury  Outcome: Progressing  Goal: Optimal Comfort and Wellbeing  Outcome: Progressing  Goal: Readiness for Transition of Care  Outcome: Progressing

## 2024-12-11 NOTE — FIRST PROVIDER EVALUATION
"Medical screening examination initiated.  I have conducted a focused provider triage encounter, findings are as follows:    Brief history of present illness:  59-year-old male presents to ED for evaluation of intermittent headaches and dizziness over the last week.  Patient states he has episodes where he feels dizzy like he wants to pass out and off balance.  Patient reports generalized body aches    Vitals:    12/10/24 1847   BP: (!) 175/92   Pulse: (!) 57   Resp: 18   Temp: 97.9 °F (36.6 °C)   TempSrc: Oral   SpO2: 100%   Weight: 83.9 kg (185 lb)   Height: 6' 1" (1.854 m)       Pertinent physical exam:  Patient is awake and alert and oriented.  Ambulatory to triage.  In no acute distress.      Brief workup plan:  labs, EKG, CT head     Preliminary workup initiated; this workup will be continued and followed by the physician or advanced practice provider that is assigned to the patient when roomed.  "

## 2024-12-11 NOTE — ED PROVIDER NOTES
Encounter Date: 12/10/2024       History     Chief Complaint   Patient presents with    Dizziness     Pt to ED via POV. Pt endorses dizziness and severe headache X 1 week. Denies cardiac hx. Steady gait in triage. GCS 15     See MDM    The history is provided by the patient. No  was used.     Review of patient's allergies indicates:   Allergen Reactions    Zoloft [sertraline]      Past Medical History:   Diagnosis Date    BPH (benign prostatic hyperplasia)     Cataract     Glaucoma      Past Surgical History:   Procedure Laterality Date    COLONOSCOPY      PHACOEMULSIFICATION WITH INSERTION, I-STENT Right 4/11/2023    Procedure: PHACO WITH IOL, HYDRUS- OD **SYNERGY**;  Surgeon: Jami Wheeler MD;  Location: [facility] OR;  Service: Ophthalmology;  Laterality: Right;    RHINOPLASTY      2015     No family history on file.  Social History     Tobacco Use    Smoking status: Never     Passive exposure: Never    Smokeless tobacco: Never   Substance Use Topics    Alcohol use: Not Currently     Review of Systems   Constitutional:  Negative for fever.   Respiratory:  Negative for cough and shortness of breath.    Cardiovascular:  Negative for chest pain.   Gastrointestinal:  Negative for abdominal pain.   Genitourinary:  Negative for difficulty urinating and dysuria.   Musculoskeletal:  Negative for gait problem.   Skin:  Negative for color change.   Neurological:  Positive for dizziness and headaches. Negative for speech difficulty.   Psychiatric/Behavioral:  Negative for hallucinations and suicidal ideas.    All other systems reviewed and are negative.      Physical Exam     Initial Vitals [12/10/24 1847]   BP Pulse Resp Temp SpO2   (!) 175/92 (!) 57 18 97.9 °F (36.6 °C) 100 %      MAP       --         Physical Exam    Nursing note and vitals reviewed.  Constitutional: He appears well-developed and well-nourished.   HENT:   Head: Normocephalic.   Eyes: EOM are normal.   Neck: Neck supple.   Normal range  of motion.  Cardiovascular:  Normal rate, regular rhythm, normal heart sounds and intact distal pulses.           Pulmonary/Chest: Breath sounds normal.   Abdominal: Abdomen is soft. Bowel sounds are normal.   Musculoskeletal:         General: Normal range of motion.      Cervical back: Normal range of motion and neck supple.     Neurological: He is alert and oriented to person, place, and time. He has normal strength.   Skin: Skin is warm and dry. Capillary refill takes less than 2 seconds.   Psychiatric: He has a normal mood and affect. His behavior is normal. Judgment and thought content normal.         ED Course   Procedures  Labs Reviewed   COMPREHENSIVE METABOLIC PANEL - Abnormal       Result Value    Sodium 136      Potassium 4.4      Chloride 107      CO2 28      Glucose 85      Blood Urea Nitrogen 13.5      Creatinine 0.88      Calcium 8.1 (*)     Protein Total 6.9      Albumin 3.9      Globulin 3.0      Albumin/Globulin Ratio 1.3      Bilirubin Total 0.5      ALP 68      ALT 17      AST 17      eGFR >60      Anion Gap 1.0      BUN/Creatinine Ratio 15     CBC WITH DIFFERENTIAL - Abnormal    WBC 7.25      RBC 4.41 (*)     Hgb 13.0 (*)     Hct 39.6 (*)     MCV 89.8      MCH 29.5      MCHC 32.8 (*)     RDW 13.0      Platelet 183      MPV 10.7 (*)     Neut % 39.9      Lymph % 44.1      Mono % 10.9      Eos % 4.0      Basophil % 1.0      Lymph # 3.20      Neut # 2.89      Mono # 0.79      Eos # 0.29      Baso # 0.07      IG# 0.01      IG% 0.1      NRBC% 0.0     COVID/FLU A&B PCR - Normal    Influenza A PCR Not Detected      Influenza B PCR Not Detected      SARS-CoV-2 PCR Not Detected      Narrative:     The Xpert Xpress SARS-CoV-2/FLU/RSV plus is a rapid, multiplexed real-time PCR test intended for the simultaneous qualitative detection and differentiation of SARS-CoV-2, Influenza A, Influenza B, and respiratory syncytial virus (RSV) viral RNA in either nasopharyngeal swab or nasal swab specimens.          TROPONIN I - Normal    Troponin-I <0.010     URINALYSIS, REFLEX TO URINE CULTURE - Normal    Color, UA Light-Yellow      Appearance, UA Clear      Specific Gravity, UA 1.024      pH, UA 6.5      Protein, UA Negative      Glucose, UA Normal      Ketones, UA Negative      Blood, UA Negative      Bilirubin, UA Negative      Urobilinogen, UA Normal      Nitrites, UA Negative      Leukocyte Esterase, UA Negative      RBC, UA 0-5      WBC, UA 0-5      Bacteria, UA None Seen      Squamous Epithelial Cells, UA None Seen     CBC W/ AUTO DIFFERENTIAL    Narrative:     The following orders were created for panel order CBC auto differential.  Procedure                               Abnormality         Status                     ---------                               -----------         ------                     CBC with Differential[[id number]]        Abnormal            Final result                 Please view results for these tests on the individual orders.   DRUG SCREEN, URINE (BEAKER)   TSH     EKG Readings: (Independently Interpreted)   Rhythm: Normal Sinus Rhythm. Heart Rate: 62. Ectopy: No Ectopy. Conduction: Normal. ST Segments: Normal ST Segments. T Waves: Normal. Axis: Normal. Clinical Impression: Normal Sinus Rhythm     ECG Results              EKG 12-lead (Final result)        Collection Time Result Time QRS Duration OHS QTC Calculation    12/10/24 18:47:49 12/10/24 22:11:28 78 393                     Final result by Interface, Lab In [facility] (12/10/24 22:11:38)                   Narrative:    Test Reason : R42,    Vent. Rate :  62 BPM     Atrial Rate :  62 BPM     P-R Int : 154 ms          QRS Dur :  78 ms      QT Int : 388 ms       P-R-T Axes :  40  21  35 degrees    QTcB Int : 393 ms    Normal sinus rhythm  Normal ECG  No previous ECGs available  Confirmed by Anthony Schmidt ([id number]) on 12/10/2024 10:11:27 PM    Referred By:            Confirmed By: Anthony Schmidt                                  Imaging Results               CT Head Without Contrast (Final result)  Result time 12/10/24 19:06:27      Final result by Jerel Horowitz MD (12/10/24 19:06:27)                   Impression:      No acute abnormality.      Electronically signed by: Jerel Horowitz MD  Date:    12/10/2024  Time:    19:06               Narrative:    EXAMINATION:  CT HEAD WITHOUT CONTRAST    CLINICAL HISTORY:  Dizziness, persistent/recurrent, cardiac or vascular cause suspected;    TECHNIQUE:  Low dose axial images were obtained through the head.  Coronal and sagittal reformations were also performed. Contrast was not administered.  An automated dose exposure technique was utilized.  This limits radiation does the patient.    COMPARISON:  07/30/2018    FINDINGS:  Intracranial compartment:    Ventricles and sulci are normal in size for age without evidence of hydrocephalus. No extra-axial blood or fluid collections.    The brain parenchyma appears normal. No parenchymal mass, hemorrhage, edema or major vascular distribution infarct.    Skull/extracranial contents (limited evaluation): No fracture. Unchanged appearance of the paranasal sinus disease.                                       X-Ray Chest 1 View (Final result)  Result time 12/10/24 19:04:06      Final result by Jerel Horowitz MD (12/10/24 19:04:06)                   Impression:      No acute abnormality.      Electronically signed by: Jerel Horowitz MD  Date:    12/10/2024  Time:    19:04               Narrative:    EXAMINATION:  XR CHEST 1 VIEW    CLINICAL HISTORY:  Dizziness and giddiness    TECHNIQUE:  Single frontal view of the chest was performed.    COMPARISON:  10/22/2024    FINDINGS:  The lungs are clear, with normal appearance of pulmonary vasculature and no pleural effusion or pneumothorax.    The cardiac silhouette is normal in size. The hilar and mediastinal contours are unremarkable.    Bones are intact.                                       Medications   sodium chloride 0.9% flush 10 mL (has no  administration in time range)   acetaminophen tablet 650 mg (has no administration in time range)   labetaloL injection 10 mg (has no administration in time range)   hydrALAZINE injection 10 mg (has no administration in time range)   ondansetron injection 4 mg (has no administration in time range)   prochlorperazine injection Soln 5 mg (has no administration in time range)   bisacodyL suppository 10 mg (has no administration in time range)   enoxaparin injection 40 mg (has no administration in time range)   aspirin EC tablet 81 mg (has no administration in time range)     Medical Decision Making  Historian:  Patient.  Patient is a Black or  59 y.o. male that presents with dizziness for a few months, headache for few weeks. Associated symptoms nothing. Surrounding information is nothing. Exacerbated by nothing. Relieved by nothing. Patient treatment prior to arrival none. Risk factors include none. Other history pertaining to this complaint nothing.   Assessment:  See physical exam.  DD:  CVA, TIA, orthostasis, electrolyte imbalance  ED Course: History was obtained.  Physical was performed.  Patient appears to be having some TIA type symptoms.  We will admit him to Hospital Medicine. Medical or surgical consults:  Hospital Medicine. Social determinants that affect healthcare:  None.       Amount and/or Complexity of Data Reviewed  Labs:      Details: Labs unremarkable  Radiology:      Details: CT and x-ray showed no acute findings  ECG/medicine tests: independent interpretation performed.     Details: See EKG section    Risk  Decision regarding hospitalization.                                      Clinical Impression:  Final diagnoses:  [R42] Dizziness  [G45.9] TIA (transient ischemic attack) (Primary)          ED Disposition Condition    Observation Stable                Sudeep Mckinnon, CASSIE  12/11/24 0004

## 2024-12-12 VITALS
OXYGEN SATURATION: 98 % | SYSTOLIC BLOOD PRESSURE: 116 MMHG | BODY MASS INDEX: 26.18 KG/M2 | RESPIRATION RATE: 14 BRPM | DIASTOLIC BLOOD PRESSURE: 77 MMHG | HEIGHT: 73 IN | WEIGHT: 197.56 LBS | HEART RATE: 59 BPM | TEMPERATURE: 98 F

## 2024-12-12 PROBLEM — R42 DIZZINESS: Status: ACTIVE | Noted: 2024-12-12

## 2024-12-12 PROCEDURE — G0378 HOSPITAL OBSERVATION PER HR: HCPCS

## 2024-12-12 PROCEDURE — 25000003 PHARM REV CODE 250: Performed by: NURSE PRACTITIONER

## 2024-12-12 RX ORDER — MECLIZINE HYDROCHLORIDE 25 MG/1
25 TABLET ORAL 3 TIMES DAILY PRN
Qty: 30 TABLET | Refills: 1 | Status: SHIPPED | OUTPATIENT
Start: 2024-12-12 | End: 2025-01-11

## 2024-12-12 RX ADMIN — ASPIRIN 81 MG: 81 TABLET, COATED ORAL at 08:12

## 2024-12-12 NOTE — PLAN OF CARE
12/12/24 1337   Discharge Assessment   Assessment Type Discharge Planning Assessment   Confirmed/corrected address, phone number and insurance Yes   Confirmed Demographics Correct on Facesheet   Source of Information patient   When was your last doctors appointment?   (Dr Baldomero Wise)   Communicated MADELINE with patient/caregiver Date not available/Unable to determine   Reason For Admission TIA (transient ischemic attack   People in Home spouse  (Janis Urias)   Do you expect to return to your current living situation? Yes   Do you have help at home or someone to help you manage your care at home? No   Prior to hospitilization cognitive status: Unable to Assess   Current cognitive status: Alert/Oriented   Walking or Climbing Stairs Difficulty no   Dressing/Bathing Difficulty no   Home Layout Able to live on 1st floor   Equipment Currently Used at Home none   Readmission within 30 days? No   Patient currently being followed by outpatient case management? No   Do you currently have service(s) that help you manage your care at home? No   Do you take prescription medications? Yes   Do you have prescription coverage? Yes   Coverage [employer]   Do you have any problems affording any of your prescribed medications? No   Is the patient taking medications as prescribed? yes   Who is going to help you get home at discharge? spouse Janis   How do you get to doctors appointments? car, drives self   Are you on dialysis? No   Do you take coumadin? No   Discharge Plan A Home;Home with family;Home Health   Discharge Plan B Home  (home with spouse)   DME Needed Upon Discharge  none   Discharge Plan discussed with: Patient   Transition of Care Barriers None   Physical Activity   On average, how many days per week do you engage in moderate to strenuous exercise (like a brisk walk)? 0 days   On average, how many minutes do you engage in exercise at this level? 0 min   Financial Resource Strain   How hard is it for  you to pay for the very basics like food, housing, medical care, and heating? Not very   Housing Stability   In the last 12 months, was there a time when you were not able to pay the mortgage or rent on time? N   At any time in the past 12 months, were you homeless or living in a shelter (including now)? N   Transportation Needs   Has the lack of transportation kept you from medical appointments, meetings, work or from getting things needed for daily living? No   Food Insecurity   Within the past 12 months, you worried that your food would run out before you got the money to buy more. Never true   Within the past 12 months, the food you bought just didn't last and you didn't have money to get more. Never true   Stress   Do you feel stress - tense, restless, nervous, or anxious, or unable to sleep at night because your mind is troubled all the time - these days? Not at all   Social Isolation   How often do you feel lonely or isolated from those around you?  Never   Alcohol Use   Q1: How often do you have a drink containing alcohol? Never   Q2: How many drinks containing alcohol do you have on a typical day when you are drinking? None   Q3: How often do you have six or more drinks on one occasion? Never   Utilities   In the past 12 months has the electric, gas, oil, or water company threatened to shut off services in your home? No   Health Literacy   How often do you need to have someone help you when you read instructions, pamphlets, or other written material from your doctor or pharmacy? Never   OTHER   Name(s) of People in Home Tranelle - spouse

## 2024-12-12 NOTE — DISCHARGE SUMMARY
[facility] Medicine Discharge Summary    Admit Date: 12/10/2024  Discharge Date and Time: 12/12/20241:29 PM  Admitting Physician:  Team  Discharging Physician: Nestor Sevilla MD.  Primary Care Physician: Baldomero Wise MD  Consults: Hospital Medicine    Discharge Diagnoses:  dizziness    Hospital Course:   Code StatusFull Code   Chief Complaint: Dizziness (Pt to ED via POV. Pt endorses dizziness and severe headache X 1 week. Denies cardiac hx. Steady gait in triage. GCS 15)        Screening for Social Drivers for health:  Patient screened for food insecurity, housing instability, transportation needs, utility difficulties, and interpersonal safety (select all that apply as identified as concern)  []Housing or Food  []Transportation Needs  []Utility Difficulties  []Interpersonal safety  [x]None      Patient information was obtained from patient, patient's family, past medical records and ER records.  ED records were reviewed in detail and documented below     HISTORY OF PRESENT ILLNESS:      Rangel Urias is a 59 y.o. male who  has a past medical history of BPH (benign prostatic hyperplasia), Cataract, and Glaucoma..      The patient presented to [facility] on 12/10/2024 with a primary complaint of dizziness.   presents to ED for evaluation of intermittent headaches and dizziness over the last week.  Patient states he has episodes where he feels dizzy like he wants to pass out and off balance.  Patient reports generalized body aches     Dizziness started about 1 month ago and has progressively worse, intermittently comes and goes nothing makes it better nothing makes it worse.    Patient also reports about 1 month ago he had right-sided weakness went to sleep and woke up weakness was resolved.    He has no known history of hypertension.  No recent changes to medications.    He is relatively active, works a full-time job and does ADLs and I ADLs with no assistance.    He does  not use tobacco products and does not drink alcohol.    Patient does not partake in illegal drug use.         MRI of the brain was completed and negative for stroke.  Dizziness resolved.  Orthostatic vitals was normal.      Patient discharged home with p.r.n. meclizine as needed for dizziness   To follow up with his PCP as needed     Pt was seen and examined on the day of discharge  Vitals:  VITAL SIGNS: 24 HRS MIN & MAX LAST   Temp  Min: 97.9 °F (36.6 °C)  Max: 98.4 °F (36.9 °C) 98.4 °F (36.9 °C)   BP  Min: 116/77  Max: 133/80 116/77   Pulse  Min: 54  Max: 60  (!) 59   Resp  Min: 14  Max: 19 14   SpO2  Min: 97 %  Max: 100 % 98 %       Physical Exam:  General appearance: Well-developed, well-nourished male in no apparent distress.  HENT: Atraumatic head. Moist mucous membranes of oral cavity.  Eyes: Normal extraocular movements.         Neck: Supple.   Lungs: Clear to auscultation bilaterally. No wheezing present.   Heart: Regular rate and rhythm. S1 and S2 present with no murmurs/gallop/rub. No pedal edema.      Abdomen: Soft, non-distended, non-tender. No rebound tenderness/guarding. Bowel sounds are normal.      Extremities: No cyanosis, clubbing, or edema.  Skin: No Rash.         Neuro: Motor and sensory exams grossly intact. Good tone  Purposeful movement noted; awake, alert, oriented. Comprehension intact, no aphasia      Psych/mental status: Appropriate mood and affect. Responds appropriately to questions.        Procedures Performed: No admission procedures for hospital encounter.     Significant Diagnostic Studies: See Full reports for all details    Recent Labs   Lab 12/10/24  1922 12/11/24  0404   WBC 7.25 5.94   RBC 4.41* 4.45*   HGB 13.0* 13.3*   HCT 39.6* 39.0*   MCV 89.8 87.6   MCH 29.5 29.9   MCHC 32.8* 34.1   RDW 13.0 13.0    171   MPV 10.7* 10.7*       Recent Labs   Lab 12/10/24  1922 12/11/24  0404    137   K 4.4 3.6    107   CO2 28 25   BUN 13.5 10.5   CREATININE 0.88 0.76    CALCIUM 8.1* 8.6   MG  --  2.00   ALBUMIN 3.9 3.6   ALKPHOS 68 59   ALT 17 17   AST 17 17   BILITOT 0.5 0.5        Microbiology Results (last 7 days)       ** No results found for the last 168 hours. **             CV Ultrasound Bilateral Doppler Carotid  The right internal carotid artery is patent with less than 50% stenosis.  The left internal carotid artery is patent with less than 50% stenosis.  Bilateral vertebral arteries are patent with antegrade flow.  Echo    Left Ventricle: The left ventricle is normal in size. Mildly increased   wall thickness. There is normal systolic function with a visually   estimated ejection fraction of 60 - 65%. There is normal diastolic   function.    Right Ventricle: Normal right ventricular cavity size. Systolic   function is normal. TAPSE is 2.28 cm.    Left Atrium: Agitated saline study of the atrial septum is negative,   suggesting absence of intracardiac shunt at the atrial level.    Mitral Valve: There is mild mitral annular calcification present. The   mean pressure gradient across the mitral valve is 1 mmHg at a heart rate   of 59 bpm. There is mild regurgitation.    Tricuspid Valve: There is trace regurgitation.    IVC/SVC: Normal venous pressure at 3 mmHg.    Pericardium: There is no pericardial effusion.  CTA Head and Neck (xpd)  Narrative: EXAMINATION:  CTA HEAD AND NECK (XPD)    CLINICAL HISTORY:  Stroke/TIA, determine embolic source;    TECHNIQUE:  Axial images of the head and neck were obtained with IV contrast administration.  Coronal and sagittal reconstructions were provided.  Three dimensional and MIP images were obtained and evaluated.  Total DLP was 1194 mGy-cm. Dose lowering technique and automated exposure control were utilized for this exam.The degree of carotid stenosis was evaluated using NASCET criteria.    COMPARISON:  CT of the head 12/10/2024.    FINDINGS:  Vascular findings:    There is a bovine configuration of the aortic arch.  The  brachiocephalic artery is normal.  The right common carotid artery is normal.  The right cervical ICA is normal.  The left common carotid artery is normal.  The left cervical ICA is normal.  There are codominant vertebral arteries which are normal throughout their cervical courses.  The intradural vertebral arteries are normal.  The basilar artery is normal.  The petrous and cavernous carotids are normal.  The right A1 segment is absent with no anterior communicating artery aneurysm.  The oyioxy-op-Nnyyax is otherwise intact.  There is no aneurysm.  There is no focal stenosis.  There is no paucity of vasculature.  The dural venous sinuses are widely patent.    Nonvascular findings:    There is no hemorrhage, hydrocephalus, or midline shift.  There is no abnormal intracranial enhancement.  The bilateral orbits are normal.  The paranasal sinuses are free of disease.  The airway is patent.  The parotid and submandibular glands are normal.  There is no cervical lymphadenopathy.  The thyroid gland is normal.  The lung apices are clear.  Impression: Normal arteries of the head and neck.    [employer] concordance    Electronically signed by: Jerel Steward MD  Date:    12/11/2024  Time:    06:38  MRI Brain Without Contrast  Narrative: EXAMINATION:  MRI BRAIN WITHOUT CONTRAST    CLINICAL HISTORY:  Transient ischemic attack (TIA);Stroke, follow up;    TECHNIQUE:  Multiplanar MRI sequences were performed of the brain without contrast.    COMPARISON:  CT brain without contrast December 10, 2024    FINDINGS:  Chronic microvascular ischemic change are minimal with subtle periventricular T2 FLAIR hyperintense signals.  There is  cerebral and cerebellar cortical volume loss. Gradient echo sequences demonstrate no evidence of de phasing artifact to suggest hemorrhagic byproducts. No evidence of diffusion restriction or ADC map signal drop out to suggest acute infarct. The sella and suprasellar areas are unremarkable.    The cerebellar  tonsils are normally positioned. There is no acute intracranial hemorrhage, hydrocephalus, midline shift or mass effect. No acute extra axial fluid collections identified. The mastoid air cells are clear.  Impression: 1.  No acute intracranial findings identified..    2.  Minimal chronic microangiopathic ischemia and atrophy.    Electronically signed by: Cuong Torres  Date:    12/11/2024  Time:    06:20         Medication List        START taking these medications      meclizine 25 mg tablet  Commonly known as: ANTIVERT  Take 1 tablet (25 mg total) by mouth 3 (three) times daily as needed for Dizziness.            CONTINUE taking these medications      FINASTERIDE ORAL               Where to Get Your Medications        These medications were sent to [facility] - [place], [place] - [address]  [address], [facility] [address]      Phone: [phone]   meclizine 25 mg tablet          Explained in detail to the patient about the discharge plan, medications, and follow-up visits. Pt understands and agrees with the treatment plan  Discharge Disposition: Home or Self Care   Discharged Condition: stable  Diet-   Dietary Orders (From admission, onward)       Start     Ordered    12/11/24 0500  Diet Heart Healthy  Diet effective now         12/11/24 0459                   Medications Per DC med rec  Activities as tolerated   Follow-up Information       Baldomero Wise MD Follow up.    Specialty: Internal Medicine  Why: As needed  Contact information:  [address]ft[address] GRAJEDA  [facility] [id number] [phone]                           For further questions contact hospitalist office    Discharge time 33 minutes    For worsening symptoms, chest pain, shortness of breath, increased abdominal pain, high grade fever, stroke or stroke like symptoms, immediately go to the nearest Emergency Room or call 911 as soon as possible.      Nestor Prince M.D on 12/12/2024.  at 1:29 PM.

## 2024-12-12 NOTE — PLAN OF CARE
12/12/24 1346   Final Note   Assessment Type Final Discharge Note   Anticipated Discharge Disposition Home   Post-Acute Status   Coverage Home -self   Discharge Delays None known at this time